# Patient Record
Sex: FEMALE | Race: WHITE | NOT HISPANIC OR LATINO | Employment: OTHER | URBAN - METROPOLITAN AREA
[De-identification: names, ages, dates, MRNs, and addresses within clinical notes are randomized per-mention and may not be internally consistent; named-entity substitution may affect disease eponyms.]

---

## 2024-10-25 ENCOUNTER — OFFICE VISIT (OUTPATIENT)
Dept: OPHTHALMOLOGY | Facility: CLINIC | Age: 64
End: 2024-10-25
Payer: MEDICARE

## 2024-10-25 DIAGNOSIS — H25.12 NUCLEAR SCLEROTIC CATARACT OF LEFT EYE: Primary | ICD-10-CM

## 2024-10-25 DIAGNOSIS — H26.492 POSTERIOR CAPSULAR OPACIFICATION VISUALLY SIGNIFICANT, LEFT EYE: ICD-10-CM

## 2024-10-25 DIAGNOSIS — H18.529 ANTERIOR BASEMENT MEMBRANE DYSTROPHY, UNSPECIFIED LATERALITY: ICD-10-CM

## 2024-10-25 PROCEDURE — 99213 OFFICE O/P EST LOW 20 MIN: CPT | Mod: PBBFAC | Performed by: OPHTHALMOLOGY

## 2024-10-25 PROCEDURE — 99214 OFFICE O/P EST MOD 30 MIN: CPT | Mod: S$PBB,,, | Performed by: OPHTHALMOLOGY

## 2024-10-25 PROCEDURE — 99999 PR PBB SHADOW E&M-EST. PATIENT-LVL III: CPT | Mod: PBBFAC,,, | Performed by: OPHTHALMOLOGY

## 2025-03-10 DIAGNOSIS — H40.1130 PRIMARY OPEN ANGLE GLAUCOMA OF BOTH EYES, UNSPECIFIED GLAUCOMA STAGE: Primary | ICD-10-CM

## 2025-03-10 RX ORDER — BRIMONIDINE TARTRATE AND TIMOLOL MALEATE 2; 5 MG/ML; MG/ML
1 SOLUTION OPHTHALMIC 2 TIMES DAILY
Qty: 5 ML | Refills: 6 | Status: SHIPPED | OUTPATIENT
Start: 2025-03-10

## 2025-03-10 NOTE — TELEPHONE ENCOUNTER
Patient requested combigan rx to faxed to [facility] @ [phone] as well as send it electronically to [facility] on file. Will send rx request for Dr. Tinsley to sign.

## 2025-03-10 NOTE — TELEPHONE ENCOUNTER
----- Message from Marvin sent at 3/10/2025  2:31 PM CDT -----  Regarding: requesting orders  Contact: [phone]  Pt needs copy of prescription for medication brimonidine-timoloL (COMBIGAN) 0.2-0.5 % Drop .Please send copy or prescription to email. Shanice [phone]

## 2025-04-29 ENCOUNTER — CLINICAL SUPPORT (OUTPATIENT)
Dept: AUDIOLOGY | Facility: CLINIC | Age: 65
End: 2025-04-29
Payer: MEDICARE

## 2025-04-29 ENCOUNTER — DOCUMENTATION ONLY (OUTPATIENT)
Dept: AUDIOLOGY | Facility: CLINIC | Age: 65
End: 2025-04-29
Payer: MEDICARE

## 2025-04-29 DIAGNOSIS — H90.3 ASYMMETRICAL SENSORINEURAL HEARING LOSS: Primary | ICD-10-CM

## 2025-04-29 DIAGNOSIS — H93.292 IMPAIRED AUDITORY DISCRIMINATION, LEFT: ICD-10-CM

## 2025-04-29 DIAGNOSIS — Z97.4 WEARS HEARING AID IN BOTH EARS: Primary | ICD-10-CM

## 2025-04-29 PROCEDURE — 92557 COMPREHENSIVE HEARING TEST: CPT | Mod: PBBFAC,PO

## 2025-04-29 PROCEDURE — 92567 TYMPANOMETRY: CPT | Mod: PBBFAC,PO

## 2025-04-29 NOTE — PROGRESS NOTES
Shanice Coulter was seen on 04/29/2025  for a transfer of care with Oticon hearing aids purchased at [facility] 2 years ago. Pt was alone during today's visit.     HA model and style: Oticon Real 1 miniRITE R in color 90  ITEMIZED  Right S/N: [id number]  Left S/N: [id number]   size: 2 - 85 AS and 2-60 AD  AU Dome size: 6 open AD and 8 single AS    Repair warranty and L&D coverage expires: 12/22/26    Pt stated she wanted an updated hearing test. Cleaned both aids and changed the wax filters and domes. Listening check revealed aids to sound appropriate for her hearing loss. Recalculated thresholds from new hearing test, performed FB test due to FB in the left ear and updated the firmware. Informed pt that a notification will be mailed when it is time for the next annual hearing test and that she should call the audiology clinic directly to schedule the appts to coordinate them correctly. Also informed the patient that if domes or wax filters are needed to please inform the clinic and they will be left at the 2nd floor check in desk to be picked up at the earliest convenience. All complaints were addressed at this visit to the patient's satisfaction. Pt should call the clinic PRN otherwise. Plan of care was discussed in detail with the patient, who agreed with the plan as above.

## 2025-04-29 NOTE — PROGRESS NOTES
Shanice Coulter was seen on 04/29/2025 for an audiological evaluation. Patient was alone during today's visit. Pertinent complaints today include hearing loss (AS>AD).  Patient utilizes binaural amplification. Patient bought her hearing aids at [employer] about 2 years ago. Patient denies history of loud noise exposure and confirms early onset of genetic family history of hearing loss  Mother and Sister). Otoscopy revealed minimal cerumen in both ears. The tympanic membrane was visualized AU prior to proceeding with the hearing testing.     Results reveal a mild-to-profound high frequency sensorineural hearing loss for the right ear and  moderate-to-severe sensorineural hearing loss for the left ear.    Speech Reception Thresholds were  15 dBHL for the right ear and 65 dBHL for the left ear.    Word recognition scores were excellent for the right ear and poor for the left ear.   Tympanograms were Type A for the right ear and Type A for the left ear.    The recommendations were as follows:  (1) Medical evaluation for asymmetry   (2) Continued use of binaural amplification   (3) Annual hearing evaluation   (4) Ear protection in loud noise    Today's test results and recommendations were discussed with the patient. Patient was seen immediately following this encounter for a transfer of care and HA adjustment.

## 2025-05-19 ENCOUNTER — OFFICE VISIT (OUTPATIENT)
Dept: OTOLARYNGOLOGY | Facility: CLINIC | Age: 65
End: 2025-05-19
Payer: MEDICARE

## 2025-05-19 ENCOUNTER — CLINICAL SUPPORT (OUTPATIENT)
Dept: AUDIOLOGY | Facility: CLINIC | Age: 65
End: 2025-05-19
Payer: MEDICARE

## 2025-05-19 VITALS — WEIGHT: 133.38 LBS | BODY MASS INDEX: 22.2 KG/M2

## 2025-05-19 DIAGNOSIS — H93.292 IMPAIRED AUDITORY DISCRIMINATION, LEFT: ICD-10-CM

## 2025-05-19 DIAGNOSIS — Z01.812 PRE-PROCEDURE LAB EXAM: ICD-10-CM

## 2025-05-19 DIAGNOSIS — J30.0 VMR (VASOMOTOR RHINITIS): ICD-10-CM

## 2025-05-19 DIAGNOSIS — H90.A22 SENSORINEURAL HEARING LOSS (SNHL) OF LEFT EAR WITH RESTRICTED HEARING OF RIGHT EAR: Primary | ICD-10-CM

## 2025-05-19 DIAGNOSIS — Z97.4 WEARS HEARING AID IN BOTH EARS: ICD-10-CM

## 2025-05-19 DIAGNOSIS — Z97.4 WEARS HEARING AID IN BOTH EARS: Primary | ICD-10-CM

## 2025-05-19 PROCEDURE — 99203 OFFICE O/P NEW LOW 30 MIN: CPT | Mod: S$PBB,,, | Performed by: NURSE PRACTITIONER

## 2025-05-19 PROCEDURE — 99213 OFFICE O/P EST LOW 20 MIN: CPT | Mod: PBBFAC,PO | Performed by: NURSE PRACTITIONER

## 2025-05-19 PROCEDURE — 99999 PR PBB SHADOW E&M-EST. PATIENT-LVL III: CPT | Mod: PBBFAC,,, | Performed by: NURSE PRACTITIONER

## 2025-05-19 PROCEDURE — 99499 UNLISTED E&M SERVICE: CPT | Mod: S$PBB,,, | Performed by: AUDIOLOGIST-HEARING AID FITTER

## 2025-05-19 RX ORDER — IPRATROPIUM BROMIDE 42 UG/1
2 SPRAY, METERED NASAL 3 TIMES DAILY
Qty: 15 ML | Refills: 12 | Status: SHIPPED | OUTPATIENT
Start: 2025-05-19 | End: 2026-05-19

## 2025-05-19 NOTE — PROGRESS NOTES
Shanice Coulter came in on 05/19/2025 for a hearing aid follow up. Pt was alone during today's visit. Pt stated the right aid was migrating out of her ear in the wind and when she took her helmet off. Explained that she should not wear the aids with a helmet for fear of losing them. Changed the dome to size 8 double bass for the right ear. Gave her size 10 to change in the event she needs a larger dome. All complaints were addressed during this visit to the patient's satisfaction. Plan of care was discussed in detail with the patient, who agreed with the plan as above.     LABS: Personally reviewed labs, imaging, and ECG                          14.2   5.85  )-----------( 233      ( 24 Jan 2024 13:57 )             42.7       01-24    132<L>  |  92<L>  |  12  ----------------------------<  528<HH>  4.1   |  25  |  0.61    Ca    9.8      24 Jan 2024 13:57    TPro  7.9  /  Alb  4.3  /  TBili  0.8  /  DBili  x   /  AST  159<H>  /  ALT  162<H>  /  AlkPhos  168<H>  01-24       LIVER FUNCTIONS - ( 24 Jan 2024 13:57 )  Alb: 4.3 g/dL / Pro: 7.9 g/dL / ALK PHOS: 168 U/L / ALT: 162 U/L / AST: 159 U/L / GGT: x                    Urinalysis Basic - ( 24 Jan 2024 13:57 )    Color: Yellow / Appearance: Clear / SG: >1.030 / pH: x  Gluc: 528 mg/dL / Ketone: 15 mg/dL  / Bili: Negative / Urobili: 0.2 mg/dL   Blood: x / Protein: 30 mg/dL / Nitrite: Negative   Leuk Esterase: Negative / RBC: 5 /HPF / WBC 4 /HPF   Sq Epi: x / Non Sq Epi: 3 /HPF / Bacteria: Negative /HPF            Lactate Trend            CAPILLARY BLOOD GLUCOSE      POCT Blood Glucose.: 313 mg/dL (24 Jan 2024 15:43)            RADIOLOGY & ADDITIONAL TESTS:

## 2025-05-19 NOTE — PROGRESS NOTES
Subjective     Patient ID: Shanice Coulter is a 64 y.o. female.    Chief Complaint: No chief complaint on file.    HPI  Patient is new to ENT, self-referred for abnormal audiogram. Patient had an audiogram done here last month which revealed marked asymmetry both in pure tones as well as speech discrim. She has been wearing hearing aids X 8 years (left X 8 years, right X 3 years) through [facility].   Duration of difficulty hearing: X 8-10 years  Laterality: favors right ear  Family h/o HL: both parents had hearing loss but not sure whether they had asymmetry  Noise exposure: concerts, loud music  Tinnitus: AU  Otologic hx: no surgery or trauma  Patient states her nose leaks like a faucet when she plays tennis or bends over.     Review of Systems   Constitutional: Negative.    HENT:  Positive for hearing loss and tinnitus.    Eyes: Negative.    Respiratory: Negative.     Cardiovascular: Negative.    Gastrointestinal: Negative.    Musculoskeletal: Negative.    Integumentary:  Negative.   Neurological: Negative.    Hematological: Negative.    Psychiatric/Behavioral: Negative.            Objective     Physical Exam  Vitals and nursing note reviewed.   Constitutional:       General: She is not in acute distress.     Appearance: She is well-developed. She is not ill-appearing.   HENT:      Head: Normocephalic and atraumatic.      Right Ear: Hearing, tympanic membrane, ear canal and external ear normal. No middle ear effusion. Tympanic membrane is not erythematous.      Left Ear: Hearing, tympanic membrane, ear canal and external ear normal.  No middle ear effusion. Tympanic membrane is not erythematous.      Nose: Nose normal.   Eyes:      General: Lids are normal. No scleral icterus.        Right eye: No discharge.         Left eye: No discharge.   Neck:      Trachea: Trachea normal. No tracheal deviation.   Cardiovascular:      Rate and Rhythm: Normal rate.   Pulmonary:      Effort: Pulmonary effort is normal. No  "respiratory distress.      Breath sounds: No stridor. No wheezing.   Musculoskeletal:         General: Normal range of motion.      Cervical back: Normal range of motion and neck supple.   Skin:     General: Skin is warm and dry.   Neurological:      Mental Status: She is alert and oriented to person, place, and time.      Coordination: Coordination normal.      Gait: Gait normal.   Psychiatric:         Attention and Perception: Attention normal.         Mood and Affect: Mood normal.         Speech: Speech normal.         Behavior: Behavior normal. Behavior is cooperative.            Assessment and Plan     1. Sensorineural hearing loss (SNHL) of left ear with restricted hearing of right ear  -     MRI IAC/Temporal Bones W W/O Contrast; Future; Expected date: 05/19/2025    2. Impaired auditory discrimination, left    3. Wears hearing aid in both ears    4. VMR (vasomotor rhinitis)  -     ipratropium (ATROVENT) 42 mcg (0.06 %) nasal spray; 2 sprays by Each Nostril route 3 (three) times daily. To decrease watery nasal discharge  Dispense: 15 mL; Refill: 12    5. Pre-procedure lab exam  -     Creatinine, Serum; Future; Expected date: 05/19/2025      MRI brain with gadolinium per IAC protocol for asymmetrical sensorineural hearing loss to rule out retrocochlear pathology: acoustic schwannoma or cerebellar pontine angle mass. We will call patient with results as soon as available.  Recommend continued daily use of binaural amplification.  Recommend annual audiogram to monitor hearing loss.   Recommend hearing protection in loud noise.   Discussed Flonase will not stop "leaky faucet nose." Recommend trial of Ipratropium nasal spray for this.   Return to clinic as needed for further ENT symptoms or concerns.            No follow-ups on file.      "

## 2025-05-24 LAB
CREAT SERPL-MCNC: 0.78 MG/DL (ref 0.57–1)
EST. GFR  (NO RACE VARIABLE): 84 ML/MIN/1.73

## 2025-06-09 ENCOUNTER — HOSPITAL ENCOUNTER (OUTPATIENT)
Dept: RADIOLOGY | Facility: HOSPITAL | Age: 65
Discharge: HOME | End: 2025-06-09
Attending: NURSE PRACTITIONER
Payer: MEDICARE

## 2025-06-09 DIAGNOSIS — H90.A22 SENSORINEURAL HEARING LOSS (SNHL) OF LEFT EAR WITH RESTRICTED HEARING OF RIGHT EAR: ICD-10-CM

## 2025-06-09 PROCEDURE — A9585 GADOBUTROL INJECTION: HCPCS | Mod: PO | Performed by: NURSE PRACTITIONER

## 2025-06-09 PROCEDURE — 70553 MRI BRAIN STEM W/O & W/DYE: CPT | Mod: TC,PO

## 2025-06-09 PROCEDURE — 25500020 PHARM REV CODE 255: Mod: PO | Performed by: NURSE PRACTITIONER

## 2025-06-09 PROCEDURE — 70553 MRI BRAIN STEM W/O & W/DYE: CPT | Mod: 26,,, | Performed by: RADIOLOGY

## 2025-06-09 RX ORDER — GADOBUTROL 604.72 MG/ML
6 INJECTION INTRAVENOUS
Status: COMPLETED | OUTPATIENT
Start: 2025-06-09 | End: 2025-06-09

## 2025-06-09 RX ADMIN — GADOBUTROL 6 ML: 604.72 INJECTION INTRAVENOUS at 02:06
